# Patient Record
Sex: FEMALE | URBAN - METROPOLITAN AREA
[De-identification: names, ages, dates, MRNs, and addresses within clinical notes are randomized per-mention and may not be internally consistent; named-entity substitution may affect disease eponyms.]

---

## 2022-05-26 ENCOUNTER — OFFICE VISIT (OUTPATIENT)
Dept: URBAN - METROPOLITAN AREA CLINIC 35 | Facility: CLINIC | Age: 62
End: 2022-05-26
Payer: COMMERCIAL

## 2022-05-26 VITALS
DIASTOLIC BLOOD PRESSURE: 80 MMHG | WEIGHT: 140 LBS | BODY MASS INDEX: 27.48 KG/M2 | HEIGHT: 60 IN | SYSTOLIC BLOOD PRESSURE: 122 MMHG

## 2022-05-26 DIAGNOSIS — Z86.010 PERSONAL HISTORY OF COLONIC POLYPS: ICD-10-CM

## 2022-05-26 DIAGNOSIS — K21.9 GASTROESOPHAGEAL REFLUX DISEASE WITHOUT ESOPHAGITIS: ICD-10-CM

## 2022-05-26 DIAGNOSIS — Z80.0 FAMILY HISTORY OF COLON CANCER: ICD-10-CM

## 2022-05-26 PROCEDURE — 99204 OFFICE O/P NEW MOD 45 MIN: CPT | Performed by: PHYSICIAN ASSISTANT

## 2022-05-26 RX ORDER — VALSARTAN 80 MG/1
1 TABLET TABLET ORAL ONCE A DAY
Status: ACTIVE | COMMUNITY

## 2022-05-26 RX ORDER — SODIUM PICOSULFATE, MAGNESIUM OXIDE, AND ANHYDROUS CITRIC ACID 10; 3.5; 12 MG/160ML; G/160ML; G/160ML
AS DIRECTED ML LIQUID ORAL AS DIRECTED
Qty: 1 | Refills: 0 | OUTPATIENT
Start: 2022-05-26 | End: 2022-05-28

## 2022-05-26 RX ORDER — LEVOTHYROXINE SODIUM 75 UG/1
1 TABLET IN THE MORNING ON AN EMPTY STOMACH TABLET ORAL ONCE A DAY
Status: ACTIVE | COMMUNITY

## 2022-05-26 RX ORDER — ROSUVASTATIN CALCIUM 5 MG/1
1 TABLET TABLET, FILM COATED ORAL ONCE A DAY
Status: ACTIVE | COMMUNITY

## 2022-05-26 NOTE — HPI-COLONOSCOPY SCREENING
62 year old female who presents today for a colorectal cancer screening.  She currently admits 1 bowel movement per day with normal and formed stools. Patient denies seeing any blood or mucous in the stool. Patient denies melena.   There is no history of bleeding disorders or respiratory diseases. Patient has never had any previous complications with anesthesia. There is no history of spinal cord injuries.   Admits a paternal family history of colon cancer within her uncle.  She denies a family hx of gastric, or esophageal cancer/polyps or disease.  Colonoscopy 2014 at [facility] revealing a personal hx of colon polyps.

## 2022-05-26 NOTE — HPI-GERD
Patient admits the use of Pantoprazole to control any symptoms of acid reflux that she may have. She reports that she will use medication as needed, and when she has a flare, will take daily to every other day for a week or so.. She recently states that she has had to take it every other day for 1 week due to a reflux flare.   No dysphagia, early satiety, abd pain, nausea, emesis.

## 2022-06-29 PROBLEM — 428283002: Status: ACTIVE | Noted: 2022-05-26

## 2022-06-29 PROBLEM — 266435005: Status: ACTIVE | Noted: 2022-05-26

## 2022-08-08 ENCOUNTER — OFFICE VISIT (OUTPATIENT)
Dept: URBAN - METROPOLITAN AREA SURGERY CENTER 8 | Facility: SURGERY CENTER | Age: 62
End: 2022-08-08
Payer: COMMERCIAL

## 2022-08-08 ENCOUNTER — CLAIMS CREATED FROM THE CLAIM WINDOW (OUTPATIENT)
Dept: URBAN - METROPOLITAN AREA CLINIC 4 | Facility: CLINIC | Age: 62
End: 2022-08-08
Payer: COMMERCIAL

## 2022-08-08 DIAGNOSIS — D12.2 BENIGN NEOPLASM OF ASCENDING COLON: ICD-10-CM

## 2022-08-08 DIAGNOSIS — D12.3 BENIGN NEOPLASM OF TRANSVERSE COLON: ICD-10-CM

## 2022-08-08 DIAGNOSIS — D12.5 ADENOMA OF SIGMOID COLON: ICD-10-CM

## 2022-08-08 DIAGNOSIS — K21.9 GASTRO-ESOPHAGEAL REFLUX DISEASE WITHOUT ESOPHAGITIS: ICD-10-CM

## 2022-08-08 DIAGNOSIS — K63.89 OTHER SPECIFIED DISEASES OF INTESTINE: ICD-10-CM

## 2022-08-08 DIAGNOSIS — Z86.010 ADENOMAS PERSONAL HISTORY OF COLONIC POLYPS: ICD-10-CM

## 2022-08-08 DIAGNOSIS — K21.9 ACID REFLUX: ICD-10-CM

## 2022-08-08 DIAGNOSIS — K31.A11 GASTRIC INTESTINAL METAPLASIA WITHOUT DYSPLASIA, INVOLVING THE ANTRUM: ICD-10-CM

## 2022-08-08 DIAGNOSIS — Z12.11 COLON CANCER SCREENING: ICD-10-CM

## 2022-08-08 DIAGNOSIS — D12.2 ADENOMA OF ASCENDING COLON: ICD-10-CM

## 2022-08-08 DIAGNOSIS — K63.89 BACTERIAL OVERGROWTH SYNDROME: ICD-10-CM

## 2022-08-08 DIAGNOSIS — D12.5 BENIGN NEOPLASM OF SIGMOID COLON: ICD-10-CM

## 2022-08-08 DIAGNOSIS — D12.3 ADENOMA OF TRANSVERSE COLON: ICD-10-CM

## 2022-08-08 DIAGNOSIS — D12.0 ADENOMA OF CECUM: ICD-10-CM

## 2022-08-08 DIAGNOSIS — D12.0 BENIGN NEOPLASM OF CECUM: ICD-10-CM

## 2022-08-08 DIAGNOSIS — K31.89 OTHER DISEASES OF STOMACH AND DUODENUM: ICD-10-CM

## 2022-08-08 PROCEDURE — 45380 COLONOSCOPY AND BIOPSY: CPT | Performed by: INTERNAL MEDICINE

## 2022-08-08 PROCEDURE — 43239 EGD BIOPSY SINGLE/MULTIPLE: CPT | Performed by: INTERNAL MEDICINE

## 2022-08-08 PROCEDURE — 45385 COLONOSCOPY W/LESION REMOVAL: CPT | Performed by: INTERNAL MEDICINE

## 2022-08-08 PROCEDURE — 88312 SPECIAL STAINS GROUP 1: CPT | Performed by: PATHOLOGY

## 2022-08-08 PROCEDURE — G8907 PT DOC NO EVENTS ON DISCHARG: HCPCS | Performed by: INTERNAL MEDICINE

## 2022-08-08 PROCEDURE — 88305 TISSUE EXAM BY PATHOLOGIST: CPT | Performed by: PATHOLOGY

## 2022-08-29 ENCOUNTER — CLAIMS CREATED FROM THE CLAIM WINDOW (OUTPATIENT)
Dept: URBAN - METROPOLITAN AREA CLINIC 35 | Facility: CLINIC | Age: 62
End: 2022-08-29
Payer: COMMERCIAL

## 2022-08-29 ENCOUNTER — DASHBOARD ENCOUNTERS (OUTPATIENT)
Age: 62
End: 2022-08-29

## 2022-08-29 VITALS
HEART RATE: 81 BPM | SYSTOLIC BLOOD PRESSURE: 122 MMHG | WEIGHT: 138 LBS | BODY MASS INDEX: 27.09 KG/M2 | DIASTOLIC BLOOD PRESSURE: 76 MMHG | OXYGEN SATURATION: 98 % | HEIGHT: 60 IN

## 2022-08-29 DIAGNOSIS — D12.8 BENIGN NEOPLASM OF RECTUM: ICD-10-CM

## 2022-08-29 DIAGNOSIS — D12.3 BENIGN NEOPLASM OF TRANSVERSE COLON: ICD-10-CM

## 2022-08-29 DIAGNOSIS — Z80.0 FAMILY HISTORY OF COLON CANCER: ICD-10-CM

## 2022-08-29 DIAGNOSIS — D12.4 BENIGN NEOPLASM OF DESCENDING COLON: ICD-10-CM

## 2022-08-29 DIAGNOSIS — K31.89 GASTRIC FOVEOLAR HYPERPLASIA: ICD-10-CM

## 2022-08-29 DIAGNOSIS — K64.2 GRADE III HEMORRHOIDS: ICD-10-CM

## 2022-08-29 DIAGNOSIS — K64.4 EXTERNAL HEMORRHOIDS: ICD-10-CM

## 2022-08-29 DIAGNOSIS — D12.0 ADENOMA OF CECUM: ICD-10-CM

## 2022-08-29 DIAGNOSIS — K21.00 GASTROESOPHAGEAL REFLUX DISEASE WITH ESOPHAGITIS WITHOUT HEMORRHAGE: ICD-10-CM

## 2022-08-29 DIAGNOSIS — D12.6 BENIGN NEOPLASM OF COLON, UNSPECIFIED PART OF COLON: ICD-10-CM

## 2022-08-29 DIAGNOSIS — D12.2 ADENOMA OF ASCENDING COLON: ICD-10-CM

## 2022-08-29 DIAGNOSIS — D12.5 BENIGN NEOPLASM OF SIGMOID COLON: ICD-10-CM

## 2022-08-29 DIAGNOSIS — K31.A11 INTESTINAL METAPLASIA OF ANTRUM OF STOMACH WITHOUT DYSPLASIA: ICD-10-CM

## 2022-08-29 PROBLEM — 266433003: Status: ACTIVE | Noted: 2022-08-29

## 2022-08-29 PROCEDURE — 99214 OFFICE O/P EST MOD 30 MIN: CPT | Performed by: PHYSICIAN ASSISTANT

## 2022-08-29 PROCEDURE — 99214 OFFICE O/P EST MOD 30 MIN: CPT | Performed by: INTERNAL MEDICINE

## 2022-08-29 RX ORDER — PANTOPRAZOLE SODIUM 40 MG/1
1 TABLET TABLET, DELAYED RELEASE ORAL ONCE A DAY
Status: ACTIVE | COMMUNITY

## 2022-08-29 RX ORDER — PANTOPRAZOLE SODIUM 40 MG/1
1 TABLET TABLET, DELAYED RELEASE ORAL ONCE A DAY
Qty: 90 | Refills: 0

## 2022-08-29 RX ORDER — LEVOTHYROXINE SODIUM 75 UG/1
1 TABLET IN THE MORNING ON AN EMPTY STOMACH TABLET ORAL ONCE A DAY
Status: ACTIVE | COMMUNITY

## 2022-08-29 RX ORDER — VALSARTAN 80 MG/1
1 TABLET TABLET ORAL ONCE A DAY
Status: ACTIVE | COMMUNITY

## 2022-08-29 RX ORDER — ROSUVASTATIN CALCIUM 5 MG/1
1 TABLET TABLET, FILM COATED ORAL ONCE A DAY
Status: ACTIVE | COMMUNITY

## 2022-08-29 NOTE — HPI-COLONOSCOPY FOLLOWUP
63 y/o female patient presents today for follow up to his/her colonoscopy, which was completed on 08/8/2022 by Dr. Wilson. Patient denies any complications after the procedure. Patient currently admits 1-2 formed bowel movements per day. Patient denies any melena, blood or mucus in stools. Patient denies any associated abdominal pain, heartburn, or bloating. She admits to gas.  Colonoscopy report shows: One 8 mm polyp in the cecum, removed with a cold snare.  Resected and retrieved. - One 8 mm polyp at the hepatic flexure, removed with a cold snare.  Resected and retrieved. - One 6 mm polyp in the ascending colon, removed with a cold snare.  Resected and retrieved. - One 3 mm polyp in the transverse colon, removed with a cold snare.  Resected and retrieved. - One 2 mm polyp at the splenic flexure, removed with a cold biopsy forceps.  Resected and retrieved. - One 3 mm polyp in the descending colon, removed with a cold biopsy forceps.  Resected and retrieved. - One 4 mm polyp in the sigmoid colon, removed with a cold snare.  Resected and retrieved. - Two 2 to 3 mm polyps in the rectum, removed with a cold biopsy forceps.  Resected and retrieved. - Non-thrombosed external hemorrhoids found on perianal exam. - Non-bleeding internal hemorrhoids. - Non-bleeding internal hemorrhoids   Colon, Hepatic Flexure, Polypectomy: TUBULAR ADENOMA(S). (E) Colon, Ascending, Polypectomy: TUBULAR ADENOMA(S).  HYPERPLASTIC POLYP(S). (F) Colon, Transverse, Polypectomy: TUBULAR ADENOMA(S).  HYPERPLASTIC POLYP(S). (G) Colon, Splenic Flexure, Polypectomy: BENIGN MUCOSAL POLYP(S).  See Comment.  Negative for Dysplasia or Malignancy

## 2022-08-29 NOTE — HPI-ENDOSCOPY (EGD) FOLLOWUP
63 y/o female patient presents today for follow-up to her EGD, which was completed on 08/8/2022 by Dr. Wilson. Patient denies any complications after the procedure. Since the procedure, the patient denies dysphagia, heartburn, globus, changes in appetite, abdominal/epigastric pain or changes in bowel habits. She continues to use Pantoprazole 40 mg, as needed and patient states this works well for her.  EGD report shows: Normal esophagus. - Z-line regular, 36 cm from the incisors. - Gastritis.  Biopsied. - Normal examined duodenum. 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              See Comment.   Negative for H. Pylori, Dysplasia or Malignancy.  COMMENT: Foveolar hyperplasia is common in gastric mucosa adjacent to ulcer or as part of chemical gastropathy (such as NSAID, Alcohol and Bile reflex), and rare in the gastric mucosa overlying a mass lesion. (B) Esophagus, Lower-Third, Biopsy: SQUAMOUS MUCOSA WITH REFLUX-TYPE CHANGES; NO COLUMNAR MUCOSA               IDENTIFIED.  No Evidence of Power's Esophagus or Eosinophilic Esophagitis.  Negative for Infectious organisms, Dysplasia or Malignancy. (C) Cecum, Polypectomy: TUBULAR ADENOMA